# Patient Record
Sex: MALE | Race: WHITE | NOT HISPANIC OR LATINO | URBAN - METROPOLITAN AREA
[De-identification: names, ages, dates, MRNs, and addresses within clinical notes are randomized per-mention and may not be internally consistent; named-entity substitution may affect disease eponyms.]

---

## 2023-12-15 ENCOUNTER — OFFICE VISIT (OUTPATIENT)
Dept: URGENT CARE | Facility: CLINIC | Age: 33
End: 2023-12-15
Payer: COMMERCIAL

## 2023-12-15 VITALS
TEMPERATURE: 98.8 F | DIASTOLIC BLOOD PRESSURE: 95 MMHG | HEART RATE: 94 BPM | OXYGEN SATURATION: 96 % | RESPIRATION RATE: 18 BRPM | SYSTOLIC BLOOD PRESSURE: 137 MMHG

## 2023-12-15 DIAGNOSIS — J02.0 STREP THROAT: Primary | ICD-10-CM

## 2023-12-15 LAB — POC RAPID STREP: POSITIVE

## 2023-12-15 PROCEDURE — 99203 OFFICE O/P NEW LOW 30 MIN: CPT | Performed by: PHYSICIAN ASSISTANT

## 2023-12-15 PROCEDURE — 87880 STREP A ASSAY W/OPTIC: CPT | Performed by: PHYSICIAN ASSISTANT

## 2023-12-15 RX ORDER — AMOXICILLIN 500 MG/1
500 CAPSULE ORAL 2 TIMES DAILY
Qty: 20 CAPSULE | Refills: 0 | Status: SHIPPED | OUTPATIENT
Start: 2023-12-15 | End: 2023-12-15 | Stop reason: SDUPTHER

## 2023-12-15 RX ORDER — AMOXICILLIN 500 MG/1
500 CAPSULE ORAL 2 TIMES DAILY
Qty: 20 CAPSULE | Refills: 0 | Status: SHIPPED | OUTPATIENT
Start: 2023-12-15 | End: 2023-12-25

## 2023-12-15 NOTE — PROGRESS NOTES
Subjective   Patient ID: Albin Soriano is a 33 y.o. male who presents for Sore Throat.  Patient here with sore throat myalgia headache since Monday.  Denies any significant cough denies any nausea vomiting diarrhea or abdominal pain denies any chest pain or shortness of breath      The remainder of the systems were reviewed and are negative unless noted above      No past medical history on file.        Objective   BP (!) 137/95   Pulse 94   Temp 37.1 °C (98.8 °F)   Resp 18   SpO2 96%   Physical Exam  Vitals reviewed.   Constitutional:       General: He is not in acute distress.     Appearance: Normal appearance. He is not toxic-appearing.   HENT:      Head: Normocephalic.      Right Ear: Tympanic membrane and ear canal normal. No tenderness. No mastoid tenderness.      Left Ear: Tympanic membrane and ear canal normal. No tenderness. No mastoid tenderness.      Nose: Congestion and rhinorrhea present.      Mouth/Throat:      Mouth: Mucous membranes are moist.      Pharynx: Oropharynx is clear. Posterior oropharyngeal erythema present.   Eyes:      Conjunctiva/sclera: Conjunctivae normal.      Pupils: Pupils are equal, round, and reactive to light.   Cardiovascular:      Rate and Rhythm: Normal rate and regular rhythm.      Heart sounds: No murmur heard.  Pulmonary:      Effort: No respiratory distress.      Breath sounds: No stridor. No wheezing, rhonchi or rales.   Chest:      Chest wall: No tenderness.   Abdominal:      Tenderness: There is no abdominal tenderness. There is no guarding.   Musculoskeletal:         General: Normal range of motion.   Skin:     General: Skin is warm and dry.      Capillary Refill: Capillary refill takes less than 2 seconds.      Findings: No erythema.   Neurological:      General: No focal deficit present.      Mental Status: He is alert.         Assessment/Plan   Problem List Items Addressed This Visit       Strep throat - Primary    Relevant Medications    amoxicillin (Amoxil)  500 mg capsule    Other Relevant Orders    POCT rapid strep A manually resulted      -Amoxicillin twice daily for the next 10 days

## 2024-01-19 ENCOUNTER — OFFICE VISIT (OUTPATIENT)
Dept: URGENT CARE | Facility: CLINIC | Age: 34
End: 2024-01-19
Payer: COMMERCIAL

## 2024-01-19 VITALS
SYSTOLIC BLOOD PRESSURE: 150 MMHG | HEART RATE: 83 BPM | OXYGEN SATURATION: 97 % | DIASTOLIC BLOOD PRESSURE: 80 MMHG | TEMPERATURE: 97.6 F | RESPIRATION RATE: 18 BRPM | WEIGHT: 180 LBS

## 2024-01-19 DIAGNOSIS — H10.9 CONJUNCTIVITIS OF RIGHT EYE, UNSPECIFIED CONJUNCTIVITIS TYPE: Primary | ICD-10-CM

## 2024-01-19 PROCEDURE — 99213 OFFICE O/P EST LOW 20 MIN: CPT | Performed by: PHYSICIAN ASSISTANT

## 2024-01-19 RX ORDER — POLYMYXIN B SULFATE AND TRIMETHOPRIM 1; 10000 MG/ML; [USP'U]/ML
1 SOLUTION OPHTHALMIC EVERY 4 HOURS
Qty: 10 ML | Refills: 0 | Status: SHIPPED | OUTPATIENT
Start: 2024-01-19 | End: 2024-01-24

## 2024-01-19 ASSESSMENT — ENCOUNTER SYMPTOMS
EYE DISCHARGE: 1
GASTROINTESTINAL NEGATIVE: 1
EYE REDNESS: 1
NEUROLOGICAL NEGATIVE: 1
PSYCHIATRIC NEGATIVE: 1
MUSCULOSKELETAL NEGATIVE: 1
RESPIRATORY NEGATIVE: 1
ENDOCRINE NEGATIVE: 1
CONSTITUTIONAL NEGATIVE: 1
EYE PAIN: 0
ALLERGIC/IMMUNOLOGIC NEGATIVE: 1
CARDIOVASCULAR NEGATIVE: 1
HEMATOLOGIC/LYMPHATIC NEGATIVE: 1

## 2024-01-19 ASSESSMENT — PAIN SCALES - GENERAL: PAINLEVEL: 0-NO PAIN

## 2024-01-19 NOTE — PROGRESS NOTES
Subjective   Patient ID: Albin Soriano is a 33 y.o. male.      History provided by:  Patient   used: No    Eye Problem  Associated symptoms: discharge and redness      This is a 33 yr old male for right eye conjunctival erythema and pus drainage x 1 day, No vision change, globe pain, contact lens use, eye injury or trauma.     Review of Systems   Constitutional: Negative.    HENT: Negative.     Eyes:  Positive for discharge and redness. Negative for pain and visual disturbance.   Respiratory: Negative.     Cardiovascular: Negative.    Gastrointestinal: Negative.    Endocrine: Negative.    Genitourinary: Negative.    Musculoskeletal: Negative.    Skin: Negative.    Allergic/Immunologic: Negative.    Neurological: Negative.    Hematological: Negative.    Psychiatric/Behavioral: Negative.     All other systems reviewed and are negative.  /80   Pulse 83   Temp 36.4 °C (97.6 °F)   Resp 18   Wt 81.6 kg (180 lb)   SpO2 97%     Objective   Physical Exam  Vitals and nursing note reviewed.   Constitutional:       Appearance: Normal appearance.   HENT:      Head: Normocephalic and atraumatic.      Right Ear: Tympanic membrane and ear canal normal.      Left Ear: Tympanic membrane and ear canal normal.      Mouth/Throat:      Mouth: Mucous membranes are moist.      Pharynx: Oropharynx is clear.   Eyes:      Pupils: Pupils are equal, round, and reactive to light.      Comments: Right eye-erythematous conjunctiva, yellow eye drainage seen   Cardiovascular:      Rate and Rhythm: Normal rate and regular rhythm.   Pulmonary:      Effort: Pulmonary effort is normal.      Breath sounds: Normal breath sounds.   Skin:     General: Skin is warm and dry.   Neurological:      General: No focal deficit present.      Mental Status: He is alert and oriented to person, place, and time.   Psychiatric:         Mood and Affect: Mood normal.         Behavior: Behavior normal.     Assessment:  Conjunctivitis right eye      Plan:  Polytrim ophthalmic soln 1 gtt q 4 hrs   Good handwashing  Pcp follow up this week if not improving or worsening   ER visit anytime 24/7 for acute worsening or changing condition

## 2024-02-04 ENCOUNTER — OFFICE VISIT (OUTPATIENT)
Dept: URGENT CARE | Facility: CLINIC | Age: 34
End: 2024-02-04
Payer: COMMERCIAL

## 2024-02-04 VITALS
SYSTOLIC BLOOD PRESSURE: 113 MMHG | HEART RATE: 87 BPM | DIASTOLIC BLOOD PRESSURE: 77 MMHG | WEIGHT: 170 LBS | RESPIRATION RATE: 16 BRPM | TEMPERATURE: 97.9 F | HEIGHT: 70 IN | BODY MASS INDEX: 24.34 KG/M2 | OXYGEN SATURATION: 97 %

## 2024-02-04 DIAGNOSIS — J02.9 SORE THROAT: ICD-10-CM

## 2024-02-04 LAB — POC RAPID STREP: NEGATIVE

## 2024-02-04 PROCEDURE — 87880 STREP A ASSAY W/OPTIC: CPT | Performed by: PHYSICIAN ASSISTANT

## 2024-02-04 PROCEDURE — 1036F TOBACCO NON-USER: CPT | Performed by: PHYSICIAN ASSISTANT

## 2024-02-04 PROCEDURE — 99213 OFFICE O/P EST LOW 20 MIN: CPT | Performed by: PHYSICIAN ASSISTANT

## 2024-02-04 RX ORDER — ASCORBIC ACID 500 MG
500 TABLET ORAL
COMMUNITY

## 2024-02-04 ASSESSMENT — PAIN SCALES - GENERAL: PAINLEVEL: 3

## 2024-02-04 NOTE — PROGRESS NOTES
"Subjective   Patient ID: Albin Soriano is a 33 y.o. male who presents for Sore Throat (X1 week. +exposure to strep from wife x2 weeks ago.) and Nasal Congestion.  Denies any new onset fevers chills chest pain shortness of breath.  He is endorsing postnasal drip, cough.  Has 2 small children at home as well.        The remainder of the systems were reviewed and are negative unless noted above      Objective   /77   Pulse 87   Temp 36.6 °C (97.9 °F) (Temporal)   Resp 16   Ht 1.778 m (5' 10\")   Wt 77.1 kg (170 lb)   SpO2 97%   BMI 24.39 kg/m²   Physical Exam  Vitals reviewed.   Constitutional:       General: He is not in acute distress.     Appearance: Normal appearance. He is not toxic-appearing.   HENT:      Head: Normocephalic.      Right Ear: Tympanic membrane and ear canal normal. No tenderness. No mastoid tenderness.      Left Ear: Tympanic membrane and ear canal normal. No tenderness. No mastoid tenderness.      Nose: Congestion and rhinorrhea present.      Mouth/Throat:      Mouth: Mucous membranes are moist.      Pharynx: Oropharynx is clear. Posterior oropharyngeal erythema present.   Eyes:      Conjunctiva/sclera: Conjunctivae normal.      Pupils: Pupils are equal, round, and reactive to light.   Cardiovascular:      Rate and Rhythm: Normal rate and regular rhythm.      Heart sounds: No murmur heard.  Pulmonary:      Effort: No respiratory distress.      Breath sounds: No stridor. No wheezing, rhonchi or rales.   Chest:      Chest wall: No tenderness.   Abdominal:      Tenderness: There is no abdominal tenderness. There is no guarding.   Musculoskeletal:         General: Normal range of motion.   Skin:     General: Skin is warm and dry.      Capillary Refill: Capillary refill takes less than 2 seconds.      Findings: No erythema.   Neurological:      General: No focal deficit present.      Mental Status: He is alert.         Assessment/Plan   Problem List Items Addressed This Visit       Sore " throat    Relevant Orders    POCT rapid strep A manually resulted (Completed)   Patient with symptoms and history consistent with a viral upper respiratory infection   Antibiotics are not necessary and do not help treat viral respiratory tract infections.   Common respiratory tract infections include the common cold. Typical symptoms include nasal congestion, a runny nose, scratchy throat, cough, and irritability.    EVIDENCE BASED MEDICINE FOR UPPER RESPIRATORY INFECTIONS:    The best evidence for the prevention of the common cold supports physical interventions (e.g., handwashing).    The best evidence for traditional treatments supports the use of acetaminophen and nonsteroidal anti-inflammatory drugs (for pain and fever) and possibly antihistamine-decongestant combinations. Ibuprofen appears to be superior to acetaminophen for the treatment of fever in children.    The best evidence for nontraditional treatments of the common cold supports the use of honey at bedtime for cough in children over one year.

## 2024-02-04 NOTE — PATIENT INSTRUCTIONS
Assessment/Plan   Problem List Items Addressed This Visit       Sore throat    Relevant Orders    POCT rapid strep A manually resulted (Completed)   Patient with symptoms and history consistent with a viral upper respiratory infection   Antibiotics are not necessary and do not help treat viral respiratory tract infections.   Common respiratory tract infections include the common cold. Typical symptoms include nasal congestion, a runny nose, scratchy throat, cough, and irritability.    EVIDENCE BASED MEDICINE FOR UPPER RESPIRATORY INFECTIONS:    The best evidence for the prevention of the common cold supports physical interventions (e.g., handwashing).    The best evidence for traditional treatments supports the use of acetaminophen and nonsteroidal anti-inflammatory drugs (for pain and fever) and possibly antihistamine-decongestant combinations. Ibuprofen appears to be superior to acetaminophen for the treatment of fever in children.    The best evidence for nontraditional treatments of the common cold supports the use of honey at bedtime for cough in children over one year.

## 2024-04-08 ENCOUNTER — OFFICE VISIT (OUTPATIENT)
Dept: URGENT CARE | Facility: CLINIC | Age: 34
End: 2024-04-08
Payer: COMMERCIAL

## 2024-04-08 VITALS
OXYGEN SATURATION: 97 % | BODY MASS INDEX: 25.77 KG/M2 | SYSTOLIC BLOOD PRESSURE: 114 MMHG | RESPIRATION RATE: 18 BRPM | HEART RATE: 81 BPM | DIASTOLIC BLOOD PRESSURE: 77 MMHG | TEMPERATURE: 97.8 F | HEIGHT: 70 IN | WEIGHT: 180 LBS

## 2024-04-08 DIAGNOSIS — H01.004 BLEPHARITIS OF LEFT UPPER EYELID, UNSPECIFIED TYPE: Primary | ICD-10-CM

## 2024-04-08 PROCEDURE — 99213 OFFICE O/P EST LOW 20 MIN: CPT | Performed by: PHYSICIAN ASSISTANT

## 2024-04-08 RX ORDER — ERYTHROMYCIN 5 MG/G
OINTMENT OPHTHALMIC 3 TIMES DAILY
Qty: 3.5 G | Refills: 0 | Status: SHIPPED | OUTPATIENT
Start: 2024-04-08 | End: 2024-04-18

## 2024-04-08 ASSESSMENT — PAIN SCALES - GENERAL: PAINLEVEL: 0-NO PAIN

## 2024-04-08 NOTE — PATIENT INSTRUCTIONS
Assessment/Plan   Problem List Items Addressed This Visit       Blepharitis of left upper eyelid - Primary    Relevant Medications    erythromycin (Romycin) 5 mg/gram (0.5 %) ophthalmic ointment      History and physical exam consistent with likely blepharitis.  Sending erythromycin for coverage of infectious etiology.  Recommending warm compresses to the eye throughout the day.  The remainder of the eye exam is normal

## 2024-04-08 NOTE — PROGRESS NOTES
"Subjective   Patient ID: Albin Soriano is a 33 y.o. male who presents for Eye Problem (L eye swelling, irritation x1 day).  Patient is here with complaints of irritation of the left upper eyelid since last night and seems to be worsened this morning.  Swelling appreciated to the left upper eyelid, erythema.  Patient endorses some watery discharge but no significant purulent discharge he denies any pain at the eye denies any visual changes patient denies any trauma to the eyes.  Denies any fevers or chills.  Denies any chest pain or shortness of breath nausea vomiting diarrhea or abdominal pain patient otherwise has no further complaints today    No past medical history on file.      The remainder of the systems were reviewed and are negative unless noted above      Objective   /77   Pulse 81   Temp 36.6 °C (97.8 °F)   Resp 18   Ht 1.778 m (5' 10\")   Wt 81.6 kg (180 lb)   SpO2 97%   BMI 25.83 kg/m²   Physical Exam  Constitutional:       Appearance: Normal appearance.   HENT:      Head: Normocephalic and atraumatic.   Eyes:      General:         Right eye: No discharge.         Left eye: No discharge.      Conjunctiva/sclera: Conjunctivae normal.      Pupils: Pupils are equal, round, and reactive to light.      Comments: Left upper eyelid noted to be mildly erythematous, edematous.  As for the left eye itself there is no injection, anterior chambers clear bilaterally.  No rashes about the periorbital area   Cardiovascular:      Rate and Rhythm: Normal rate and regular rhythm.   Pulmonary:      Effort: Pulmonary effort is normal.      Breath sounds: Normal breath sounds.   Neurological:      Mental Status: He is alert.         Assessment/Plan   Problem List Items Addressed This Visit       Blepharitis of left upper eyelid - Primary    Relevant Medications    erythromycin (Romycin) 5 mg/gram (0.5 %) ophthalmic ointment      History and physical exam consistent with likely blepharitis.  Sending erythromycin " for coverage of infectious etiology.  Recommending warm compresses to the eye throughout the day.  The remainder of the eye exam is normal

## 2024-04-11 ENCOUNTER — OFFICE VISIT (OUTPATIENT)
Dept: URGENT CARE | Facility: CLINIC | Age: 34
End: 2024-04-11
Payer: COMMERCIAL

## 2024-04-11 VITALS
SYSTOLIC BLOOD PRESSURE: 136 MMHG | DIASTOLIC BLOOD PRESSURE: 91 MMHG | TEMPERATURE: 97.9 F | HEART RATE: 77 BPM | RESPIRATION RATE: 18 BRPM | OXYGEN SATURATION: 96 %

## 2024-04-11 DIAGNOSIS — J02.8 SORE THROAT (VIRAL): Primary | ICD-10-CM

## 2024-04-11 DIAGNOSIS — B97.89 SORE THROAT (VIRAL): Primary | ICD-10-CM

## 2024-04-11 PROCEDURE — 99213 OFFICE O/P EST LOW 20 MIN: CPT

## 2024-04-11 RX ADMIN — Medication 10 MG: at 16:05

## 2024-04-11 ASSESSMENT — ENCOUNTER SYMPTOMS
CHEST TIGHTNESS: 0
FEVER: 0
VOMITING: 0
SHORTNESS OF BREATH: 0
COUGH: 0
HEADACHES: 0
ABDOMINAL PAIN: 0
DIARRHEA: 0
CHILLS: 0
NAUSEA: 0
SORE THROAT: 1
MYALGIAS: 0
RHINORRHEA: 1

## 2024-04-11 NOTE — PROGRESS NOTES
Subjective   Patient ID: Albin Soriano is a 33 y.o. male.      Sore Throat   Associated symptoms include congestion. Pertinent negatives include no abdominal pain, coughing, diarrhea, headaches, shortness of breath or vomiting.       Patient presents urgent care for complaints of sore throat for the last 3 days.  Patient also endorses some runny nose and congestion.  Patient states that he has been taking Tylenol, cough drops, hot tea with honey with no relief of symptoms.  Patient denies any nausea, vomiting, diarrhea.  Patient states that he does have 2 young children at home that have also been sick.  Patient states that he is concerned as last time he had a sore throat he tested positive for strep and just wanted to be checked again.    Review of Systems   Constitutional:  Negative for chills and fever.   HENT:  Positive for congestion, rhinorrhea and sore throat.    Respiratory:  Negative for cough, chest tightness and shortness of breath.    Cardiovascular:  Negative for chest pain.   Gastrointestinal:  Negative for abdominal pain, diarrhea, nausea and vomiting.   Musculoskeletal:  Negative for myalgias.   Neurological:  Negative for headaches.     Objective   Physical Exam  Constitutional:       Appearance: Normal appearance.   HENT:      Head: Normocephalic and atraumatic.      Right Ear: Tympanic membrane, ear canal and external ear normal.      Left Ear: Tympanic membrane, ear canal and external ear normal.      Nose: Congestion present.      Mouth/Throat:      Mouth: Mucous membranes are moist.      Pharynx: Oropharynx is clear. Posterior oropharyngeal erythema present.   Eyes:      Extraocular Movements: Extraocular movements intact.      Conjunctiva/sclera: Conjunctivae normal.      Pupils: Pupils are equal, round, and reactive to light.   Cardiovascular:      Rate and Rhythm: Normal rate and regular rhythm.      Pulses: Normal pulses.      Heart sounds: Normal heart sounds.   Pulmonary:      Effort:  Pulmonary effort is normal.      Breath sounds: Normal breath sounds.   Abdominal:      General: Abdomen is flat. Bowel sounds are normal.      Palpations: Abdomen is soft.   Musculoskeletal:      Cervical back: Normal range of motion.   Skin:     General: Skin is warm and dry.      Capillary Refill: Capillary refill takes less than 2 seconds.   Neurological:      General: No focal deficit present.      Mental Status: He is alert and oriented to person, place, and time.         Discussed with patient that rapid strep was negative here in office today.  I did give a one-time dose of Decadron to help with symptoms.  Continue with Tylenol/ibuprofen as well as a decongestant to help with symptoms.  Follow-up with PCP if no improvement.    Assessment/Plan   Problem List Items Addressed This Visit             ICD-10-CM    Sore throat (viral) - Primary J02.8, B97.89    Relevant Medications    dexAMETHasone (Decadron) 10 mg/mL oral liquid 10 mg (Start on 4/11/2024 12:45 PM)       Patient disposition: Home